# Patient Record
Sex: MALE | Race: WHITE | Employment: FULL TIME | URBAN - METROPOLITAN AREA
[De-identification: names, ages, dates, MRNs, and addresses within clinical notes are randomized per-mention and may not be internally consistent; named-entity substitution may affect disease eponyms.]

---

## 2022-01-28 ENCOUNTER — HOSPITAL ENCOUNTER (OUTPATIENT)
Dept: ULTRASOUND IMAGING | Age: 42
Discharge: HOME | End: 2022-01-28
Attending: FAMILY MEDICINE
Payer: COMMERCIAL

## 2022-01-28 DIAGNOSIS — R74.8 ELEVATED LIVER ENZYMES: ICD-10-CM

## 2022-01-28 PROCEDURE — 76705 ECHO EXAM OF ABDOMEN: CPT

## 2022-02-02 NOTE — PROGRESS NOTES
US shows fatty liver. There is a small percentage that can lead to cirrhosis, so please work on diet because that is the only way to correct fatty liver. Try to get 150 min cardio in a week. No fried foods. Limit eating out to once weekly. Limit saturated fats (butter, processed foods, tate). Red meat only 2x/month. If he needs help, we have a program called [employer] [facility] that I can refer him to.